# Patient Record
Sex: FEMALE | Race: OTHER | Employment: UNEMPLOYED | URBAN - METROPOLITAN AREA
[De-identification: names, ages, dates, MRNs, and addresses within clinical notes are randomized per-mention and may not be internally consistent; named-entity substitution may affect disease eponyms.]

---

## 2022-07-26 ENCOUNTER — HOSPITAL ENCOUNTER (EMERGENCY)
Age: 22
Discharge: HOME | End: 2022-07-26
Attending: EMERGENCY MEDICINE

## 2022-07-26 VITALS
HEART RATE: 95 BPM | OXYGEN SATURATION: 100 % | RESPIRATION RATE: 18 BRPM | TEMPERATURE: 97.9 F | SYSTOLIC BLOOD PRESSURE: 106 MMHG | DIASTOLIC BLOOD PRESSURE: 68 MMHG | WEIGHT: 129.19 LBS

## 2022-07-26 DIAGNOSIS — N93.8 DUB (DYSFUNCTIONAL UTERINE BLEEDING): Primary | ICD-10-CM

## 2022-07-26 LAB
ALBUMIN SERPL-MCNC: 3.8 G/DL (ref 3.5–5)
ALBUMIN/GLOB SERPL: 1 {RATIO} (ref 1.1–2.2)
ALP SERPL-CCNC: 62 U/L (ref 45–117)
ALT SERPL-CCNC: 24 U/L (ref 12–78)
ANION GAP SERPL CALC-SCNC: 4 MMOL/L (ref 5–15)
APPEARANCE UR: CLEAR
AST SERPL-CCNC: 20 U/L (ref 15–37)
BACTERIA URNS QL MICRO: NEGATIVE /HPF
BASOPHILS # BLD: 0 K/UL (ref 0–0.1)
BASOPHILS NFR BLD: 0 % (ref 0–1)
BILIRUB SERPL-MCNC: 1.2 MG/DL (ref 0.2–1)
BILIRUB UR QL: NEGATIVE
BUN SERPL-MCNC: 10 MG/DL (ref 6–20)
BUN/CREAT SERPL: 15 (ref 12–20)
CALCIUM SERPL-MCNC: 9 MG/DL (ref 8.5–10.1)
CHLORIDE SERPL-SCNC: 106 MMOL/L (ref 97–108)
CO2 SERPL-SCNC: 28 MMOL/L (ref 21–32)
COLOR UR: ABNORMAL
CREAT SERPL-MCNC: 0.67 MG/DL (ref 0.55–1.02)
DIFFERENTIAL METHOD BLD: NORMAL
EOSINOPHIL # BLD: 0.1 K/UL (ref 0–0.4)
EOSINOPHIL NFR BLD: 1 % (ref 0–7)
EPITH CASTS URNS QL MICRO: ABNORMAL /LPF
ERYTHROCYTE [DISTWIDTH] IN BLOOD BY AUTOMATED COUNT: 12.7 % (ref 11.5–14.5)
GLOBULIN SER CALC-MCNC: 3.9 G/DL (ref 2–4)
GLUCOSE SERPL-MCNC: 89 MG/DL (ref 65–100)
GLUCOSE UR STRIP.AUTO-MCNC: NEGATIVE MG/DL
HCG UR QL: NEGATIVE
HCT VFR BLD AUTO: 40 % (ref 35–47)
HGB BLD-MCNC: 12.8 G/DL (ref 11.5–16)
HGB UR QL STRIP: ABNORMAL
HYALINE CASTS URNS QL MICRO: ABNORMAL /LPF (ref 0–2)
IMM GRANULOCYTES # BLD AUTO: 0 K/UL (ref 0–0.04)
IMM GRANULOCYTES NFR BLD AUTO: 0 % (ref 0–0.5)
KETONES UR QL STRIP.AUTO: NEGATIVE MG/DL
LEUKOCYTE ESTERASE UR QL STRIP.AUTO: ABNORMAL
LYMPHOCYTES # BLD: 2.4 K/UL (ref 0.8–3.5)
LYMPHOCYTES NFR BLD: 23 % (ref 12–49)
MCH RBC QN AUTO: 28 PG (ref 26–34)
MCHC RBC AUTO-ENTMCNC: 32 G/DL (ref 30–36.5)
MCV RBC AUTO: 87.5 FL (ref 80–99)
MONOCYTES # BLD: 0.6 K/UL (ref 0–1)
MONOCYTES NFR BLD: 6 % (ref 5–13)
NEUTS SEG # BLD: 7.3 K/UL (ref 1.8–8)
NEUTS SEG NFR BLD: 70 % (ref 32–75)
NITRITE UR QL STRIP.AUTO: NEGATIVE
NRBC # BLD: 0 K/UL (ref 0–0.01)
NRBC BLD-RTO: 0 PER 100 WBC
PH UR STRIP: 7 [PH] (ref 5–8)
PLATELET # BLD AUTO: 395 K/UL (ref 150–400)
PMV BLD AUTO: 9.9 FL (ref 8.9–12.9)
POTASSIUM SERPL-SCNC: 3.8 MMOL/L (ref 3.5–5.1)
PROT SERPL-MCNC: 7.7 G/DL (ref 6.4–8.2)
PROT UR STRIP-MCNC: ABNORMAL MG/DL
RBC # BLD AUTO: 4.57 M/UL (ref 3.8–5.2)
RBC #/AREA URNS HPF: >100 /HPF (ref 0–5)
SODIUM SERPL-SCNC: 138 MMOL/L (ref 136–145)
SP GR UR REFRACTOMETRY: 1.02
UA: UC IF INDICATED,UAUC: ABNORMAL
UROBILINOGEN UR QL STRIP.AUTO: 1 EU/DL (ref 0.2–1)
WBC # BLD AUTO: 10.5 K/UL (ref 3.6–11)
WBC URNS QL MICRO: ABNORMAL /HPF (ref 0–4)

## 2022-07-26 PROCEDURE — 81001 URINALYSIS AUTO W/SCOPE: CPT

## 2022-07-26 PROCEDURE — 87086 URINE CULTURE/COLONY COUNT: CPT

## 2022-07-26 PROCEDURE — 99283 EMERGENCY DEPT VISIT LOW MDM: CPT

## 2022-07-26 PROCEDURE — 80053 COMPREHEN METABOLIC PANEL: CPT

## 2022-07-26 PROCEDURE — 36415 COLL VENOUS BLD VENIPUNCTURE: CPT

## 2022-07-26 PROCEDURE — 85025 COMPLETE CBC W/AUTO DIFF WBC: CPT

## 2022-07-26 PROCEDURE — 81025 URINE PREGNANCY TEST: CPT

## 2022-07-26 PROCEDURE — 74011250637 HC RX REV CODE- 250/637: Performed by: PHYSICIAN ASSISTANT

## 2022-07-26 RX ORDER — IBUPROFEN 400 MG/1
800 TABLET ORAL ONCE
Status: COMPLETED | OUTPATIENT
Start: 2022-07-26 | End: 2022-07-26

## 2022-07-26 RX ADMIN — IBUPROFEN 800 MG: 400 TABLET, FILM COATED ORAL at 17:35

## 2022-07-26 NOTE — ED PROVIDER NOTES
EMERGENCY DEPARTMENT HISTORY AND PHYSICAL EXAM      Date: 7/26/2022  Patient Name: Danielle Campbell    History of Presenting Illness     Chief Complaint   Patient presents with    Abdominal Pain     Pt arrives ambulatory to triage, reports abd pain and vaginal bleeding starting today while she was lifting something heavy. Patient is not sure if she is pregnant. History Provided By: Patient  Due to language barrier, an [employer]  was present during the history-taking and subsequent discussion (and for part of the physical exam) with this patient. HPI: Danielle Campbell, 24 y.o. female without reported PMHx presents BIB self to the ED with cc of sharp lower abdominal pain this afternoon with associated vaginal bleeding. Pain is crampy, nonradiating. Bleeding is described as red and like a menstrual period. She denies excessive bleeding or passing clots. The patient is concerned because she just had a menstrual period 3 weeks ago. One month ago she underwent IUI and has been taking an unknown hormonal oral medication to stimulate ovulation. She is concerned that she may have been pregnant and is now suffering a miscarriage. She has not taken a pregnancy test at home. Denies fever, nausea, vomiting, dysuria, urinary urgency or frequency, change in bowel movements. There are no other complaints, changes, or physical findings at this time. PCP: None    No current facility-administered medications on file prior to encounter. No current outpatient medications on file prior to encounter. Past History     Past Medical History:  No past medical history on file. Past Surgical History:  No past surgical history on file. Family History:  No family history on file. Social History: Allergies:  No Known Allergies      Review of Systems   Review of Systems   Constitutional:  Negative for fever. HENT:  Negative for voice change. Eyes:  Negative for redness.    Respiratory: Negative for shortness of breath. Cardiovascular:  Negative for chest pain. Gastrointestinal:  Positive for abdominal pain. Negative for nausea and vomiting. Genitourinary:  Positive for vaginal bleeding. Negative for difficulty urinating, dysuria, frequency and urgency. Musculoskeletal:  Negative for gait problem. Skin:  Negative for rash. Allergic/Immunologic: Negative for immunocompromised state. Neurological:  Negative for dizziness. Hematological:  Does not bruise/bleed easily. Physical Exam   Physical Exam  Vitals and nursing note reviewed. Constitutional:       General: She is not in acute distress. Appearance: Normal appearance. She is well-developed. She is not toxic-appearing. HENT:      Head: Normocephalic and atraumatic. Nose: Nose normal.      Mouth/Throat:      Mouth: Mucous membranes are moist.   Eyes:      General: Lids are normal.      Extraocular Movements: Extraocular movements intact. Conjunctiva/sclera: Conjunctivae normal.   Cardiovascular:      Rate and Rhythm: Normal rate and regular rhythm. Pulmonary:      Effort: Pulmonary effort is normal.      Breath sounds: Normal breath sounds. Abdominal:      Palpations: Abdomen is soft. Tenderness: There is no abdominal tenderness. There is no right CVA tenderness, left CVA tenderness, guarding or rebound. Musculoskeletal:         General: Normal range of motion. Cervical back: Normal range of motion and neck supple. Skin:     General: Skin is warm and dry. Coloration: Skin is not pale. Neurological:      General: No focal deficit present. Mental Status: She is alert and oriented to person, place, and time. Gait: Gait normal.   Psychiatric:         Mood and Affect: Mood normal.         Behavior: Behavior normal. Behavior is cooperative.        Diagnostic Study Results     Labs -     Recent Results (from the past 12 hour(s))   CBC WITH AUTOMATED DIFF    Collection Time: 07/26/22 4:06 PM   Result Value Ref Range    WBC 10.5 3.6 - 11.0 K/uL    RBC 4.57 3.80 - 5.20 M/uL    HGB 12.8 11.5 - 16.0 g/dL    HCT 40.0 35.0 - 47.0 %    MCV 87.5 80.0 - 99.0 FL    MCH 28.0 26.0 - 34.0 PG    MCHC 32.0 30.0 - 36.5 g/dL    RDW 12.7 11.5 - 14.5 %    PLATELET 604 525 - 413 K/uL    MPV 9.9 8.9 - 12.9 FL    NRBC 0.0 0  WBC    ABSOLUTE NRBC 0.00 0.00 - 0.01 K/uL    NEUTROPHILS 70 32 - 75 %    LYMPHOCYTES 23 12 - 49 %    MONOCYTES 6 5 - 13 %    EOSINOPHILS 1 0 - 7 %    BASOPHILS 0 0 - 1 %    IMMATURE GRANULOCYTES 0 0.0 - 0.5 %    ABS. NEUTROPHILS 7.3 1.8 - 8.0 K/UL    ABS. LYMPHOCYTES 2.4 0.8 - 3.5 K/UL    ABS. MONOCYTES 0.6 0.0 - 1.0 K/UL    ABS. EOSINOPHILS 0.1 0.0 - 0.4 K/UL    ABS. BASOPHILS 0.0 0.0 - 0.1 K/UL    ABS. IMM. GRANS. 0.0 0.00 - 0.04 K/UL    DF AUTOMATED     METABOLIC PANEL, COMPREHENSIVE    Collection Time: 07/26/22  4:06 PM   Result Value Ref Range    Sodium 138 136 - 145 mmol/L    Potassium 3.8 3.5 - 5.1 mmol/L    Chloride 106 97 - 108 mmol/L    CO2 28 21 - 32 mmol/L    Anion gap 4 (L) 5 - 15 mmol/L    Glucose 89 65 - 100 mg/dL    BUN 10 6 - 20 MG/DL    Creatinine 0.67 0.55 - 1.02 MG/DL    BUN/Creatinine ratio 15 12 - 20      GFR est AA >60 >60 ml/min/1.73m2    GFR est non-AA >60 >60 ml/min/1.73m2    Calcium 9.0 8.5 - 10.1 MG/DL    Bilirubin, total 1.2 (H) 0.2 - 1.0 MG/DL    ALT (SGPT) 24 12 - 78 U/L    AST (SGOT) 20 15 - 37 U/L    Alk.  phosphatase 62 45 - 117 U/L    Protein, total 7.7 6.4 - 8.2 g/dL    Albumin 3.8 3.5 - 5.0 g/dL    Globulin 3.9 2.0 - 4.0 g/dL    A-G Ratio 1.0 (L) 1.1 - 2.2     URINALYSIS W/ REFLEX CULTURE    Collection Time: 07/26/22  4:29 PM    Specimen: Urine   Result Value Ref Range    Color YELLOW/STRAW      Appearance CLEAR CLEAR      Specific gravity 1.023      pH (UA) 7.0 5.0 - 8.0      Protein TRACE (A) NEG mg/dL    Glucose Negative NEG mg/dL    Ketone Negative NEG mg/dL    Bilirubin Negative NEG      Blood LARGE (A) NEG      Urobilinogen 1.0 0.2 - 1.0 EU/dL Nitrites Negative NEG      Leukocyte Esterase SMALL (A) NEG      UA:UC IF INDICATED URINE CULTURE ORDERED (A) CNI      WBC 10-20 0 - 4 /hpf    RBC >100 (H) 0 - 5 /hpf    Epithelial cells MODERATE (A) FEW /lpf    Bacteria Negative NEG /hpf    Hyaline cast 0-2 0 - 2 /lpf   HCG URINE, QL. - POC    Collection Time: 07/26/22  4:30 PM   Result Value Ref Range    Pregnancy test,urine (POC) Negative NEG         Radiologic Studies -   No orders to display     CT Results  (Last 48 hours)      None          CXR Results  (Last 48 hours)      None              Medical Decision Making   I am the first provider for this patient. I reviewed the vital signs, available nursing notes, past medical history, past surgical history, family history and social history. Vital Signs-Reviewed the patient's vital signs. Patient Vitals for the past 12 hrs:   Temp Pulse Resp BP SpO2   07/26/22 1537 97.9 °F (36.6 °C) 95 18 106/68 100 %       Records Reviewed: Nursing Notes    Provider Notes (Medical Decision Making):   61-year-old female who underwent IUI 1 month ago and started taking an unknown oral hormonal medication 1 month ago presents with lower abdominal cramping and associated abdominal pain that started acutely today. Patient's LMP was 3 weeks ago. Given this recent IUI, patient was worried she may be having a miscarriage. Here in the ED she is well-appearing and in no acute distress. Vital signs are stable. Abdomen is soft and nontender. Pregnancy test is negative. UA shows hematuria, though I suspect this is contaminant from vaginal bleeding. No overt evidence of infection. Labs are otherwise negative/normal, hemoglobin is stable. Given her benign abdominal exam, I do not feel further work-up with imaging is indicated at this time. I suspect the irregular vaginal bleeding is likely secondary to the recent hormonal medications her OB/GYN started her on.   I recommended that she follow-up with her OB/GYN in 1 to 2 days for further guidance. ED return precautions given. The patient is in agreement with this plan. ED Course:   Initial assessment performed. The patients presenting problems have been discussed, and they are in agreement with the care plan formulated and outlined with them. I have encouraged them to ask questions as they arise throughout their visit. Critical Care Time: None    Disposition:  dc    PLAN:  1. There are no discharge medications for this patient. 2.   Follow-up Information       Follow up With Specialties Details Why Contact Info    [facility] EMERGENCY DEPT Emergency Medicine  As needed, If symptoms worsen [address] [place] [address]  [phone]    Your OBGYN  Call  For follow up           Return to ED if worse     Diagnosis     Clinical Impression:   1. DUB (dysfunctional uterine bleeding)          Please note that this dictation was completed with [employer], the computer voice recognition software. Quite often unanticipated grammatical, syntax, homophones, and other interpretive errors are inadvertently transcribed by the computer software. Please disregards these errors. Please excuse any errors that have escaped final proofreading.

## 2022-07-27 LAB
BACTERIA SPEC CULT: NORMAL
SERVICE CMNT-IMP: NORMAL

## 2022-11-14 LAB
HEP B, EXTERNAL RESULT: NORMAL
RUBELLA TITER, EXTERNAL RESULT: NORMAL

## 2022-11-28 ENCOUNTER — HOSPITAL ENCOUNTER (EMERGENCY)
Age: 22
Discharge: STILL ADMITTED OR ADMINISTRATIVE | End: 2022-11-28

## 2023-01-19 LAB — HIV, EXTERNAL RESULT: NORMAL

## 2023-02-14 LAB
ABO, EXTERNAL RESULT: NORMAL
RPR, EXTERNAL RESULT: NON REACTIVE

## 2023-02-19 ENCOUNTER — APPOINTMENT (OUTPATIENT)
Dept: GENERAL RADIOLOGY | Age: 23
End: 2023-02-19
Attending: EMERGENCY MEDICINE
Payer: MEDICAID

## 2023-02-19 ENCOUNTER — HOSPITAL ENCOUNTER (EMERGENCY)
Age: 23
Discharge: HOME | End: 2023-02-19
Attending: EMERGENCY MEDICINE
Payer: MEDICAID

## 2023-02-19 VITALS
HEIGHT: 62 IN | SYSTOLIC BLOOD PRESSURE: 124 MMHG | BODY MASS INDEX: 27.63 KG/M2 | TEMPERATURE: 98.1 F | HEART RATE: 104 BPM | RESPIRATION RATE: 20 BRPM | WEIGHT: 150.13 LBS | OXYGEN SATURATION: 99 % | DIASTOLIC BLOOD PRESSURE: 79 MMHG

## 2023-02-19 DIAGNOSIS — S00.01XA ABRASION OF SCALP, INITIAL ENCOUNTER: ICD-10-CM

## 2023-02-19 DIAGNOSIS — Z3A.29 29 WEEKS GESTATION OF PREGNANCY: ICD-10-CM

## 2023-02-19 DIAGNOSIS — S50.02XA CONTUSION OF LEFT ELBOW, INITIAL ENCOUNTER: ICD-10-CM
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
## 2023-02-19 PROCEDURE — 74011250636 HC RX REV CODE- 250/636: Performed by: EMERGENCY MEDICINE

## 2023-02-19 PROCEDURE — 96360 HYDRATION IV INFUSION INIT: CPT

## 2023-02-19 PROCEDURE — 73080 X-RAY EXAM OF ELBOW: CPT

## 2023-02-19 PROCEDURE — 74011250637 HC RX REV CODE- 250/637: Performed by: EMERGENCY MEDICINE

## 2023-02-19 PROCEDURE — 96361 HYDRATE IV INFUSION ADD-ON: CPT

## 2023-02-19 PROCEDURE — 99284 EMERGENCY DEPT VISIT MOD MDM: CPT

## 2023-02-19 RX ORDER — LIDOCAINE HYDROCHLORIDE AND EPINEPHRINE 10; 10 MG/ML; UG/ML
1.5 INJECTION, SOLUTION INFILTRATION; PERINEURAL
Status: DISCONTINUED | OUTPATIENT
Start: 2023-02-19 | End: 2023-02-19 | Stop reason: HOSPADM

## 2023-02-19 RX ORDER — ACETAMINOPHEN 500 MG
1000 TABLET ORAL
Status: COMPLETED | OUTPATIENT
Start: 2023-02-19 | End: 2023-02-19

## 2023-02-19 RX ADMIN — ACETAMINOPHEN 1000 MG: 500 TABLET ORAL at 02:55

## 2023-02-19 RX ADMIN — SODIUM CHLORIDE 1000 ML: 9 INJECTION, SOLUTION INTRAVENOUS at 04:36

## 2023-02-19 NOTE — DISCHARGE INSTRUCTIONS
It was a pleasure taking care of you in our Emergency Department today. We know that when you come to [facility], you are entrusting us with your health, comfort, and safety. Our physicians and nurses honor that trust, and truly appreciate the opportunity to care for you and your loved ones. We also value your feedback. If you receive a survey about your Emergency Department experience today, please fill it out. We care about our patients' feedback, and we listen to what you have to say.   Thank you!       --- Dr. Dave Biswas MD

## 2023-02-19 NOTE — ED PROVIDER NOTES
[facility] EMERGENCY DEPT  EMERGENCY DEPARTMENT ENCOUNTER       Pt Name: Emmett George  MRN: [id number]  [place] 2000  Date of evaluation: 2023  Provider: Kelly Pagan MD   PCP: None  Note Started: 4:07 AM 23     CHIEF COMPLAINT       Chief Complaint   Patient presents with    Head Injury     Pt arrives ambulatory to triage for a lac to the left upper part of her scalp and bruises to her left inner arm and elbow. Pt advises she was walking around outside in her dark and she fell but does not remember how she fell. +LOC. -blood thinners. Pt is also 29 weeks pregnant and has been getting prenatal care. Pt denies landing on her abdomen. Denies any home analgesics. Pain 8/10. Fall        HISTORY OF PRESENT ILLNESS: 1 or more elements      History From: Patient using Colombian   HPI Limitations : None     Emmett George is a 25 y.o. female  at 33 weeks, normal pregnancy so far, who presents after GLF at home. She says that she got up from bed, was crossing the room, tripped and fell. Denies LOC (triage note states +LOC, but using  patient denies this absolutely), no confusion or vomiting. No abd pain or injury. Feels fetal movements. No vaginal bleeding. During fall she landed on her left elbow and hit left side of head on the floor. Now reports 8/10 pain in left elbow and very mild headache. No other injuries or complaints. Gets regular prenatal care. Please see more comprehensive history below under MDM  Nursing Notes were all reviewed in real time as they are made available. Any disagreements addressed in the HPI/MDM. REVIEW OF SYSTEMS      Review of Systems   HENT:  Negative for ear pain, facial swelling and nosebleeds. Eyes:  Negative for photophobia, pain and visual disturbance. Respiratory:  Negative for cough and shortness of breath. Cardiovascular:  Negative for chest pain, palpitations and leg swelling.    Gastrointestinal:  Negative for abdominal distention, abdominal pain, nausea and vomiting. Genitourinary:  Negative for dysuria, flank pain, hematuria, pelvic pain, urgency, vaginal bleeding, vaginal discharge and vaginal pain. Musculoskeletal:  Positive for joint swelling. Negative for back pain, neck pain and neck stiffness. Skin:  Positive for wound. Neurological:  Positive for headaches. Negative for dizziness, tremors, seizures, syncope, facial asymmetry, speech difficulty, weakness, light-headedness and numbness. Hematological:  Does not bruise/bleed easily. Psychiatric/Behavioral:  Negative for confusion. Positives and Pertinent negatives as per HPI and MDM. PAST HISTORY     Past Medical History:  No past medical history on file. Past Surgical History:  No past surgical history on file. Family History:  No family history on file. Social History: Allergies:  No Known Allergies    CURRENT MEDICATIONS      Previous Medications    No medications on file         PHYSICAL EXAM      ED Triage Vitals [02/19/23 0101]   ED Encounter Vitals Group      /79      Pulse (Heart Rate) (!) 104      Resp Rate 20      Temp 98.1 °F (36.7 °C)      Temp src       O2 Sat (%) 99 %      Weight 150 lb 2.1 oz      Height 5' 2\"        Physical Exam  Vitals and nursing note reviewed. Constitutional:       General: She is not in acute distress. Appearance: She is not ill-appearing. HENT:      Head: No raccoon eyes, Lee's sign, right periorbital erythema or left periorbital erythema. Jaw: There is normal jaw occlusion. No tenderness. Comments: 5mm abrasion over lateral scalp as depicted. No bleeding     Right Ear: Hearing, tympanic membrane, ear canal and external ear normal. No hemotympanum. Left Ear: Hearing, tympanic membrane, ear canal and external ear normal. No hemotympanum. Eyes:      General: Vision grossly intact. Gaze aligned appropriately.       Extraocular Movements: Extraocular movements intact. Right eye: No nystagmus. Left eye: No nystagmus. Conjunctiva/sclera: Conjunctivae normal.      Pupils: Pupils are equal, round, and reactive to light. Visual Fields: Right eye visual fields normal and left eye visual fields normal.   Cardiovascular:      Rate and Rhythm: Normal rate and regular rhythm. Pulses: Normal pulses. Heart sounds: Normal heart sounds. Pulmonary:      Effort: Pulmonary effort is normal.      Breath sounds: Normal breath sounds. Abdominal:      Palpations: Abdomen is soft. Tenderness: There is no abdominal tenderness. There is no right CVA tenderness or left CVA tenderness. Comments: Appropriately gravid uterus   Musculoskeletal:      Left elbow: Swelling present. No deformity or lacerations. Normal range of motion. Tenderness present. Arms:       Cervical back: Normal range of motion and neck supple. No tenderness. Comments: Swelling and contusion just above left elbow. Skin:     General: Skin is warm and dry. Neurological:      General: No focal deficit present. Mental Status: She is alert and oriented to person, place, and time. Sensory: Sensation is intact. Motor: No seizure activity. Gait: Gait is intact. Psychiatric:         Behavior: Behavior normal.          DIAGNOSTIC RESULTS   LABS:     No results found for this or any previous visit (from the past 24 hour(s)). RADIOLOGY:  Non-plain film images such as CT, Ultrasound and MRI are read by the radiologist. Plain radiographic images are visualized and preliminarily interpreted by the ED Provider with the below findings:     Left elbow xrays reviewed as soon as images were available. No acute process identified. Final radiology read to follow and will be copied below. Interpretation per the Radiologist below, if available at the time of this note:     XR ELBOW LT MIN 3 V   Final Result   No acute abnormality.            PROCEDURES Procedure Note - Sling Placement:  4:54 AM  Performed by: dr. Grisel Escobar  Neurovascularly intact prior to tx. A shoulder sling was placed on pt's left arm. Neurovascularly intact after tx. The procedure took 5 minutes, and pt tolerated well. Procedure Note - Bedside Ultrasound:  4:54 AM  Performed by: dr Rona Nathan of abdomen performed at beside, showing normal fetal movement,   The procedure took 1-15 minutes, and pt tolerated well. SCREENINGS     [facility] and [facility] CT head rules applied. Results both support no indication for further brain imaging. [place] CT Head criteria GCS 13-15  Age equal or greater than 65 years  Vomiting > 2 times  Suspected open or depressed Skull Fracture  Signs suggesting basal skull fracture (Hemotympanum, Racoon eyes, CSF otorrhea or rhinorrhea, Lee's sign)  GCS < 15 at 2 hours post injury  Retrograde Amnesia > 30min  Dangerous mechanism (Pedestrian struck by vehicle, Ejection from motor vehicle, Fall from >3 feet or 5 stairs)     CRITICAL CARE TIME       EMERGENCY DEPARTMENT COURSE and DIFFERENTIAL DIAGNOSIS/MDM     Vitals:    Vitals:    02/19/23 0101   BP: 124/79   Pulse: (!) 104   Resp: 20   Temp: 98.1 °F (36.7 °C)   SpO2: 99%   Weight: 68.1 kg (150 lb 2.1 oz)   Height: 5' 2\" (1.575 m)       Pertinent Chronic Medical Conditions or Prior Surgeries: none    Social Determinants affecting Dx or Tx: None    Records Reviewed: Nursing notes  I reviewed and interpreted the nursing notes and and vital signs from today's visit, as well as the electronic medical record system for any external medical records that were available that may contribute to the patients current condition. Nursing notes will be reviewed and interpreted by me as they become available in realtime while the pt has been in the ED. Independent history : history obtained using language line and [place] .       CC/HPI Summary, DDx, ED Course, and Reassessment:   Patient is an otherwise healthy 18yo  at appx 29 weeks, normal pregnancy so far and gets regular prenatal care, presents after mechanical glf at home as described in hpi. no loss of consciousness or amnestic events. Only complaint currently is right elbow pain. Normal vital signs. GCS 15. Exam notable or superficial abrasion to left side of scalp as depicted above and bruising above left elbow. No scalp hematoma or evidence of skull fracture. No other injuries noted on secondary trauma survey. Abdomen is soft, gravid, not tender. Fetal movements present, no vaginal bleeding. Ddx: scalp abrasion, closed head injury, concussion, scalp contusion. Considered ICH and skull fracture but have low concern based on exam and history. Using clinical gestalt and two separate head trauma criteria as listed under screening exam above, no indication for brain imaging. Scalp abrasion to small for repair. Not bleeding. Patient denies any headache now. Wound cleaned, no further intervention needed. Tdap is UTD. Left elbow: considered sprain, ligament tear, dislocation, fracture. She is neurovascular intact. Xrays obtained showing no acute abnormalities. Provided with ice pack, tylenol and sling. Low concern for abdominal trauma. Obtained bedside US showing fetal mvmts and FHR of 128. She had a bolus of fluids, feels much better on reassessment. Tolerating po. Steady gait, asking to go home. Medical Decision Making  Amount and/or Complexity of Data Reviewed  Radiology: ordered and independent interpretation performed. Decision-making details documented in ED Course. Risk  OTC drugs. Prescription drug management. Disposition Considerations and Planning:  I have discussed with the patient and/or caregiver my initial clinical impression which is based on an evidence-based clinical evaluation of the patient and interpretation of available results.  Involved patient and/or caregiver in management, treatment options and final disposition. Patient and/or caregiver verbalizes understanding and agreement. ED Medications Administered  Medications   lidocaine/EPINEPHrine/tetracaine (LET) topical soln (has no administration in time range)   acetaminophen (TYLENOL) tablet 1,000 mg (1,000 mg Oral Given 2/19/23 0255)       ED Orders Placed:  Orders Placed This Encounter    XR ELBOW LT MIN 3 V    FETAL HEART RATE    DURABLE MEDICAL EQUIPMENT     acetaminophen (TYLENOL) tablet 1,000 mg    lidocaine/EPINEPHrine/tetracaine (LET) topical soln       FINAL IMPRESSION     1. Fall, initial encounter    2. Contusion of left elbow, initial encounter    3. Abrasion of scalp, initial encounter    4. 29 weeks gestation of pregnancy          DISPOSITION/PLAN     Progress note:  Patient has been reassessed and reports feeling considerably better, has normal vital signs and feels comfortable going home. I think this is reasonable as no findings today suggest a life-threatening condition. DISPOSITION: DISCHARGE  The patient's results have been reviewed with patient and available family and/or caregiver. They verbally convey their understanding and agreement of the patient's signs, symptoms, diagnosis, treatment and prognosis and additionally agree to follow up as recommended in the discharge instructions or to return to the Emergency Department should the patient's condition change prior to their follow-up appointment. The patient and available family and/or caregiver verbally agree with the care plan and all of their questions have been answered. The discharge instructions have also been provided to the them with educational information regarding the patient's diagnosis as well a list of reasons why the patient would want to return to the ER prior to their follow-up appointment should any concerns arise, the patient's condition change or symptoms worsen. Joanne Hernández MD, Msc    PLAN:  There are no discharge medications for this patient.     2. Follow-up Information       Follow up With Specialties Details Why Contact Info    your next obgyn appointment  Go to  as scheduled     [facility] EMERGENCY DEPT Emergency Medicine Go to  As needed, If symptoms worsen [address]  [address]  [phone]          3. Return to ED if worse       I am the Primary Clinician of Record. Eugene Dominique MD (electronically signed)    (Please note that parts of this dictation were completed with voice recognition software. Quite often unanticipated grammatical, syntax, homophones, and other interpretive errors are inadvertently transcribed by the computer software. Please disregards these errors.  Please excuse any errors that have escaped final proofreading.)

## 2023-04-20 LAB — GBS, EXTERNAL RESULT: NORMAL

## 2023-05-08 ENCOUNTER — HOSPITAL ENCOUNTER (INPATIENT)
Facility: HOSPITAL | Age: 23
LOS: 2 days | Discharge: HOME | End: 2023-05-10
Attending: OBSTETRICS & GYNECOLOGY | Admitting: OBSTETRICS & GYNECOLOGY
Payer: COMMERCIAL

## 2023-05-08 ENCOUNTER — ANESTHESIA (OUTPATIENT)
Dept: LABOR AND DELIVERY | Facility: HOSPITAL | Age: 23
End: 2023-05-08
Payer: COMMERCIAL

## 2023-05-08 ENCOUNTER — ANESTHESIA EVENT (OUTPATIENT)
Dept: LABOR AND DELIVERY | Facility: HOSPITAL | Age: 23
End: 2023-05-08
Payer: COMMERCIAL

## 2023-05-08 DIAGNOSIS — Z3A.40 40 WEEKS GESTATION OF PREGNANCY: Primary | ICD-10-CM

## 2023-05-08 DIAGNOSIS — Z37.9 NORMAL LABOR: ICD-10-CM

## 2023-05-08 PROBLEM — O47.9 IRREGULAR LABOR: Status: RESOLVED | Noted: 2023-05-08 | Resolved: 2023-05-08

## 2023-05-08 PROBLEM — O47.9 IRREGULAR LABOR: Status: ACTIVE | Noted: 2023-05-08

## 2023-05-08 LAB
AMPHET UR QL SCN: NEGATIVE
BARBITURATES UR QL SCN: NEGATIVE
BENZODIAZ UR QL: NEGATIVE
CANNABINOIDS UR QL SCN: NEGATIVE
COCAINE UR QL SCN: NEGATIVE
ERYTHROCYTE [DISTWIDTH] IN BLOOD BY AUTOMATED COUNT: 14.7 % (ref 11.5–14.5)
HCT VFR BLD AUTO: 31.9 % (ref 35–47)
HGB BLD-MCNC: 9.7 G/DL (ref 11.5–16)
Lab: NORMAL
MCH RBC QN AUTO: 23.8 PG (ref 26–34)
MCHC RBC AUTO-ENTMCNC: 30.4 G/DL (ref 30–36.5)
MCV RBC AUTO: 78.4 FL (ref 80–99)
METHADONE UR QL: NEGATIVE
NRBC # BLD: 0 K/UL (ref 0–0.01)
NRBC BLD-RTO: 0 PER 100 WBC
OPIATES UR QL: NEGATIVE
PCP UR QL: NEGATIVE
PLATELET # BLD AUTO: 354 K/UL (ref 150–400)
PMV BLD AUTO: 11.5 FL (ref 8.9–12.9)
RBC # BLD AUTO: 4.07 M/UL (ref 3.8–5.2)
WBC # BLD AUTO: 13.3 K/UL (ref 3.6–11)

## 2023-05-08 PROCEDURE — 2500000003 HC RX 250 WO HCPCS

## 2023-05-08 PROCEDURE — 99284 EMERGENCY DEPT VISIT MOD MDM: CPT

## 2023-05-08 PROCEDURE — 0KQM0ZZ REPAIR PERINEUM MUSCLE, OPEN APPROACH: ICD-10-PCS | Performed by: OBSTETRICS & GYNECOLOGY

## 2023-05-08 PROCEDURE — 3700000156 HC EPIDURAL ANESTHESIA

## 2023-05-08 PROCEDURE — 1120000000 HC RM PRIVATE OB

## 2023-05-08 PROCEDURE — 85027 COMPLETE CBC AUTOMATED: CPT

## 2023-05-08 PROCEDURE — 86900 BLOOD TYPING SEROLOGIC ABO: CPT

## 2023-05-08 PROCEDURE — 86850 RBC ANTIBODY SCREEN: CPT

## 2023-05-08 PROCEDURE — 7220000101 HC DELIVERY VAGINAL/SINGLE

## 2023-05-08 PROCEDURE — 2500000003 HC RX 250 WO HCPCS: Performed by: STUDENT IN AN ORGANIZED HEALTH CARE EDUCATION/TRAINING PROGRAM

## 2023-05-08 PROCEDURE — 2580000003 HC RX 258: Performed by: OBSTETRICS & GYNECOLOGY

## 2023-05-08 PROCEDURE — 36415 COLL VENOUS BLD VENIPUNCTURE: CPT

## 2023-05-08 PROCEDURE — 6360000002 HC RX W HCPCS: Performed by: OBSTETRICS & GYNECOLOGY

## 2023-05-08 PROCEDURE — 6370000000 HC RX 637 (ALT 250 FOR IP): Performed by: OBSTETRICS & GYNECOLOGY

## 2023-05-08 PROCEDURE — 86901 BLOOD TYPING SEROLOGIC RH(D): CPT

## 2023-05-08 PROCEDURE — 2500000003 HC RX 250 WO HCPCS: Performed by: ANESTHESIOLOGY

## 2023-05-08 PROCEDURE — 0UQMXZZ REPAIR VULVA, EXTERNAL APPROACH: ICD-10-PCS | Performed by: OBSTETRICS & GYNECOLOGY

## 2023-05-08 PROCEDURE — 80307 DRUG TEST PRSMV CHEM ANLYZR: CPT

## 2023-05-08 PROCEDURE — 7210000100 HC LABOR FEE PER 1 HR

## 2023-05-08 RX ORDER — FENTANYL 0.2 MG/100ML-BUPIV 0.125%-NACL 0.9% EPIDURAL INJ 2/0.125 MCG/ML-%
SOLUTION INJECTION
Status: COMPLETED
Start: 2023-05-08 | End: 2023-05-08

## 2023-05-08 RX ORDER — NALOXONE HYDROCHLORIDE 0.4 MG/ML
INJECTION, SOLUTION INTRAMUSCULAR; INTRAVENOUS; SUBCUTANEOUS PRN
Status: DISCONTINUED | OUTPATIENT
Start: 2023-05-08 | End: 2023-05-09

## 2023-05-08 RX ORDER — SODIUM CHLORIDE 9 MG/ML
25 INJECTION, SOLUTION INTRAVENOUS PRN
Status: DISCONTINUED | OUTPATIENT
Start: 2023-05-08 | End: 2023-05-09

## 2023-05-08 RX ORDER — ONDANSETRON 2 MG/ML
4 INJECTION INTRAMUSCULAR; INTRAVENOUS EVERY 6 HOURS PRN
Status: DISCONTINUED | OUTPATIENT
Start: 2023-05-08 | End: 2023-05-09

## 2023-05-08 RX ORDER — FENTANYL 0.2 MG/100ML-BUPIV 0.125%-NACL 0.9% EPIDURAL INJ 2/0.125 MCG/ML-%
1-15 SOLUTION INJECTION CONTINUOUS
Status: DISCONTINUED | OUTPATIENT
Start: 2023-05-08 | End: 2023-05-09

## 2023-05-08 RX ORDER — IBUPROFEN 600 MG/1
600 TABLET ORAL EVERY 8 HOURS PRN
Status: DISCONTINUED | OUTPATIENT
Start: 2023-05-08 | End: 2023-05-10 | Stop reason: HOSPADM

## 2023-05-08 RX ORDER — SODIUM CHLORIDE, SODIUM LACTATE, POTASSIUM CHLORIDE, AND CALCIUM CHLORIDE .6; .31; .03; .02 G/100ML; G/100ML; G/100ML; G/100ML
500 INJECTION, SOLUTION INTRAVENOUS PRN
Status: DISCONTINUED | OUTPATIENT
Start: 2023-05-08 | End: 2023-05-09

## 2023-05-08 RX ORDER — SODIUM CHLORIDE 0.9 % (FLUSH) 0.9 %
5-40 SYRINGE (ML) INJECTION EVERY 12 HOURS SCHEDULED
Status: DISCONTINUED | OUTPATIENT
Start: 2023-05-08 | End: 2023-05-09

## 2023-05-08 RX ORDER — MISOPROSTOL 200 UG/1
800 TABLET ORAL PRN
Status: DISCONTINUED | OUTPATIENT
Start: 2023-05-08 | End: 2023-05-09

## 2023-05-08 RX ORDER — BUPIVACAINE HYDROCHLORIDE AND EPINEPHRINE 5; 5 MG/ML; UG/ML
INJECTION, SOLUTION EPIDURAL; INTRACAUDAL; PERINEURAL
Status: DISPENSED
Start: 2023-05-08 | End: 2023-05-08

## 2023-05-08 RX ORDER — SODIUM CHLORIDE, SODIUM LACTATE, POTASSIUM CHLORIDE, CALCIUM CHLORIDE 600; 310; 30; 20 MG/100ML; MG/100ML; MG/100ML; MG/100ML
INJECTION, SOLUTION INTRAVENOUS CONTINUOUS
Status: DISCONTINUED | OUTPATIENT
Start: 2023-05-08 | End: 2023-05-09

## 2023-05-08 RX ORDER — METHYLERGONOVINE MALEATE 0.2 MG/ML
200 INJECTION INTRAVENOUS PRN
Status: DISCONTINUED | OUTPATIENT
Start: 2023-05-08 | End: 2023-05-09

## 2023-05-08 RX ORDER — MORPHINE SULFATE 2 MG/ML
2 INJECTION, SOLUTION INTRAMUSCULAR; INTRAVENOUS EVERY 4 HOURS PRN
Status: DISCONTINUED | OUTPATIENT
Start: 2023-05-08 | End: 2023-05-09

## 2023-05-08 RX ORDER — BUPIVACAINE HYDROCHLORIDE AND EPINEPHRINE 2.5; 5 MG/ML; UG/ML
INJECTION, SOLUTION EPIDURAL; INFILTRATION; INTRACAUDAL; PERINEURAL PRN
Status: DISCONTINUED | OUTPATIENT
Start: 2023-05-08 | End: 2023-05-08 | Stop reason: SDUPTHER

## 2023-05-08 RX ORDER — SODIUM CHLORIDE 0.9 % (FLUSH) 0.9 %
5-40 SYRINGE (ML) INJECTION PRN
Status: DISCONTINUED | OUTPATIENT
Start: 2023-05-08 | End: 2023-05-09

## 2023-05-08 RX ORDER — MORPHINE SULFATE 2 MG/ML
2 INJECTION, SOLUTION INTRAMUSCULAR; INTRAVENOUS ONCE
Status: DISCONTINUED | OUTPATIENT
Start: 2023-05-08 | End: 2023-05-08

## 2023-05-08 RX ORDER — CARBOPROST TROMETHAMINE 250 UG/ML
250 INJECTION, SOLUTION INTRAMUSCULAR PRN
Status: DISCONTINUED | OUTPATIENT
Start: 2023-05-08 | End: 2023-05-09

## 2023-05-08 RX ORDER — SODIUM CHLORIDE, SODIUM LACTATE, POTASSIUM CHLORIDE, AND CALCIUM CHLORIDE .6; .31; .03; .02 G/100ML; G/100ML; G/100ML; G/100ML
1000 INJECTION, SOLUTION INTRAVENOUS PRN
Status: DISCONTINUED | OUTPATIENT
Start: 2023-05-08 | End: 2023-05-09

## 2023-05-08 RX ADMIN — Medication 166.7 ML: at 20:05

## 2023-05-08 RX ADMIN — IBUPROFEN 600 MG: 600 TABLET, FILM COATED ORAL at 22:45

## 2023-05-08 RX ADMIN — SODIUM CHLORIDE, POTASSIUM CHLORIDE, SODIUM LACTATE AND CALCIUM CHLORIDE 1000 ML: 600; 310; 30; 20 INJECTION, SOLUTION INTRAVENOUS at 06:05

## 2023-05-08 RX ADMIN — Medication 12 ML/HR: at 15:30

## 2023-05-08 RX ADMIN — Medication 87.3 MILLI-UNITS/MIN: at 20:17

## 2023-05-08 RX ADMIN — Medication 12 ML/HR: at 08:45

## 2023-05-08 RX ADMIN — MORPHINE SULFATE 2 MG: 2 INJECTION, SOLUTION INTRAMUSCULAR; INTRAVENOUS at 07:36

## 2023-05-08 RX ADMIN — Medication 166.7 ML: at 20:16

## 2023-05-08 RX ADMIN — SODIUM CHLORIDE, POTASSIUM CHLORIDE, SODIUM LACTATE AND CALCIUM CHLORIDE 125 ML/HR: 600; 310; 30; 20 INJECTION, SOLUTION INTRAVENOUS at 15:33

## 2023-05-08 RX ADMIN — BUPIVACAINE HYDROCHLORIDE AND EPINEPHRINE 5 ML: 2.5; 5 INJECTION, SOLUTION EPIDURAL; INFILTRATION; INTRACAUDAL; PERINEURAL at 17:00

## 2023-05-08 RX ADMIN — Medication 1 MILLI-UNITS/MIN: at 10:13

## 2023-05-08 RX ADMIN — SODIUM CHLORIDE 5 MILLION UNITS: 900 INJECTION INTRAVENOUS at 08:22

## 2023-05-08 RX ADMIN — SODIUM CHLORIDE 2.5 MILLION UNITS: 9 INJECTION, SOLUTION INTRAVENOUS at 11:57

## 2023-05-08 ASSESSMENT — PAIN DESCRIPTION - DESCRIPTORS: DESCRIPTORS: CRAMPING;SORE

## 2023-05-08 ASSESSMENT — PAIN SCALES - GENERAL: PAINLEVEL_OUTOF10: 4

## 2023-05-08 ASSESSMENT — PAIN DESCRIPTION - LOCATION: LOCATION: ABDOMEN;PERINEUM

## 2023-05-08 ASSESSMENT — PAIN - FUNCTIONAL ASSESSMENT: PAIN_FUNCTIONAL_ASSESSMENT: ACTIVITIES ARE NOT PREVENTED

## 2023-05-08 ASSESSMENT — PAIN DESCRIPTION - ORIENTATION: ORIENTATION: LOWER

## 2023-05-08 NOTE — PROGRESS NOTES
Patient reexamined and Cx 3/90/-2. Cat 1, RNST. Will admit. Will draw and hold new Ob labs until able to secure [facility] records. Will treat empirically with PCN for unknown GBS pending records.  Call [facility] for records at 8 AM. Patient desires Epidural.

## 2023-05-08 NOTE — PROGRESS NOTES
sodium chloride 0.9% 100 mL epidural infusion  1-15 mL/hr Epidural Continuous     Facility-Administered Medications Ordered in Other Encounters   Medication Dose Route Frequency    bupivacaine-EPINEPHrine PF (MARCAINE-w/EPINEPHrine) 0.25% -1:[id number] injection   Epidural PRN         No Known Allergies      Vitals:  BP (!) 115/59   Pulse 90   Temp 98.1 °F (36.7 °C) (Oral)   Resp 16   LMP 2022   SpO2 99%   Temp (24hrs), Av.1 °F (36.7 °C), Min:98 °F (36.7 °C), Max:98.1 °F (36.7 °C)        Blood in urinary catheter  Cervix 10   Effacement 100%   Station +1   Cx Position Anterior   Membranes ROM, Clear fluid   FHR Cat CAT 1   Dustin q 3 Minutes   Pitocin yes          Pain relief: Epidural    Assessment: 25 y.o.  at 40w3d  active labor                                                          Plan:  Begin second stage

## 2023-05-08 NOTE — ANESTHESIA PROCEDURE NOTES
CSE Block    Patient location during procedure: OB  Start time: 5/8/2023 8:05 AM  End time: 5/8/2023 8:15 AM  Reason for block: labor epidural  Staffing  Performed: anesthesiologist   Anesthesiologist: Kelvin Orellana MD  CSE  Patient position: sitting  Prep: ChloraPrep  Patient monitoring: frequent blood pressure checks and continuous pulse ox  Approach: midline  Provider prep: mask and sterile gloves  Spinal Needle  Needle type: Sprotte tip   Needle gauge: 25 G  Epidural Needle  Injection technique: GRETA air  Needle type: Tuohy   Needle gauge: 17 G  Needle insertion depth: 5 cm  Location: lumbar (1-5)  Catheter  Catheter at skin depth: 9 cm  Test dose: negative  [id number]  Hemodynamics: stable  Preanesthetic Checklist  Completed: patient identified, IV checked, site marked, risks and benefits discussed, surgical/procedural consents, equipment checked, pre-op evaluation, timeout performed, anesthesia consent given, oxygen available, monitors applied/VS acknowledged, fire risk safety assessment completed and verbalized and blood product R/B/A discussed and consented

## 2023-05-08 NOTE — H&P
Department of Obstetrics and Gynecology  Attending Obstetrics History and Physical        CHIEF COMPLAINT:  contractions    HISTORY OF PRESENT ILLNESS:      The patient is a 25 y.o.  1 parity 0 at 36 w 6 d per patient. Patient denies ROM,vaginal bleeding, or decreased FM. She is unclear as to her frequency of contractions but states they started yesterday and have been getting progressively closer and more painful. She denies any complications of pregnancy and states she had an ultrasound a month ago that was \"normal\". She has been a gyn patient of [facility] in the past but has received her obstetrical care from [facility] this pregnancy. No records are available at this point and the patient does not have knowledge of her GBS status but states she was last seen 2 weeks ago and her Cervical exam was 1 cm dilated. She does not speak [place] but speaks [place] and [place]. This information was obtained via remote . PRENATAL CARE:    Provider:  [facility]      PAST OB HISTORY    OB History    Para Term  AB Living   1             SAB IAB Ectopic Molar Multiple Live Births                    # Outcome Date GA Lbr Og/2nd Weight Sex Delivery Anes PTL Lv   1 Current                  Past Medical History:    Neg  Past Surgical History:    Neg  Social History:    TOBACCO:  Never used tobacco  ETOH:  Never drank alcohol  DRUGS:  Never used recreational drugs  Family History:   Unremarkable  Medications Prior to Admission:  No medications prior to admission. Allergies:  Patient has no known allergies.     REVIEW OF SYSTEMS:      Negative other than specified above    PHYSICAL EXAM:  T 98.1, P 86, /79, R 16  General appearance:  awake, alert, cooperative, no apparent distress, and appears stated age  Neurologic:  Deep Tendon Reflexes:  Right Knee:  2+  Left Knee:  2+  Lungs:  No increased work of breathing, good air exchange, clear to auscultation bilaterally, no crackles or wheezing  Heart:  Normal

## 2023-05-08 NOTE — PROGRESS NOTES
Faye Mack is a 25 y.o.  patient of  obgyn at [facility] who presents to L&D triage with c/o contractions. She reports Positive FM, denies vaginal bleeding and LOF. She also denies Headaches, Scotoma, RUQ pain, and Edema. Urine sample obtained. EFM and toco placed for initial assessment     0215. Attempt to obtain  for [facility]. Unable to obtain  at this time. 3495.  obtained at this time for [facility], notified pt of current hospital provider and plan to come to bedside to assess patient. History and limited prenatal history obtained from patient at this time. Per patient ESCOBAR is 23.     0330. Attempt to obtain  for [facility] at this time. 9240. Dr. Alexei Ibrahim in room, strip reviewed, SVE /. Ultrasound performed to confirm placement of baby- vertex. 7229. New  obtained at this time. Dr. Alexei Ibrahim discussing plan of care with patient, questions concerns addressed at this time. 0540. Dr. Alexei Ibrahim in room, strip reviewed, SVE 3/90/-2, plan to admit patient at this time, discussed plan with patient at this time. 0600. JACQUELINE Freedman RN discussing consents with patient at this time with . 3620. Notified Dr. Alexei Ibrahim pt requesting pain medication, unable to order nubaine at this time, notified Alexei Pizarro, alternative medication ordered. 9500. Bedside shift change report given to JACQUELINE Hernandes RN (oncoming nurse) by JACQUELINE Branch RN (offgoing nurse). Report included the following information Nurse Handoff Report.

## 2023-05-08 NOTE — PROGRESS NOTES
NST:    Baseline: 130    Variability: Moderate    Accelerations: two 15 x 15 accels in a ten minute window    Decelerations: none    Contractions: Q 3-4 minutes    Cat 1 , RNST    This test was done under my supervision and interpreted by me.     Jed Machado M.D.

## 2023-05-08 NOTE — PROGRESS NOTES
LABOR CHECK      Name: Hector Rod MRN: [id number]  SSN: [id number]    YOB: 2000  Age: 25 y.o.   Sex: female      Hector Rod 25 y.o.  at 44w3d  admitted for uterine contractions    Current Facility-Administered Medications   Medication Dose Route Frequency    lactated ringers IV soln infusion   IntraVENous Continuous    lactated ringers bolus  500 mL IntraVENous PRN    Or    lactated ringers bolus  1,000 mL IntraVENous PRN    sodium chloride flush 0.9 % injection 5-40 mL  5-40 mL IntraVENous 2 times per day    sodium chloride flush 0.9 % injection 5-40 mL  5-40 mL IntraVENous PRN    0.9 % sodium chloride infusion  25 mL IntraVENous PRN    ondansetron (ZOFRAN) injection 4 mg  4 mg IntraVENous Q6H PRN    methylergonovine (METHERGINE) injection 200 mcg  200 mcg IntraMUSCular PRN    carboprost (HEMABATE) injection 250 mcg  250 mcg IntraMUSCular PRN    miSOPROStol (CYTOTEC) tablet 800 mcg  800 mcg Rectal PRN    tranexamic acid (CYKLOKAPRON) 1,000 mg in sodium chloride 0.9 % 110 mL IVPB (mini-bag)  1,000 mg IntraVENous Once PRN    benzocaine-menthol (DERMOPLAST) 20-0.5 % spray   Topical PRN    penicillin G potassium 2.5 million units in 0.9% sodium chloride 100 mL IVPB  2.5 Million Units IntraVENous Q4H    morphine (PF) injection 2 mg  2 mg IntraVENous Once    morphine (PF) injection 2 mg  2 mg IntraVENous Q4H PRN    bupivacaine-EPINEPHrine PF (MARCAINE-w/EPINEPHRINE) 0.5% -1:116203 injection        naloxone 0.4 mg in 10 mL sodium chloride syringe   IntraVENous PRN    oxytocin (PITOCIN) 30 units in 500 mL infusion  1-20 suzette-units/min IntraVENous Continuous    naloxone 0.4 mg in 10 mL sodium chloride syringe   IntraVENous PRN    fentaNYL 2 mcg/mL bupivacaine 0.125% in sodium chloride 0.9% 100 mL epidural infusion  1-15 mL/hr Epidural Continuous         No Known Allergies      Vitals:  BP (!) 115/55   Pulse (!) 103   Temp 98.1 °F (36.7 °C)

## 2023-05-08 NOTE — PROGRESS NOTES
used to discuss consents with pt.  disconnected before call was finished. Consents were able to be obtained and pt verbalized understanding.

## 2023-05-08 NOTE — PROGRESS NOTES
sodium chloride 0.9% 100 mL epidural infusion  1-15 mL/hr Epidural Continuous         No Known Allergies      Vitals:  BP (!) 115/59   Pulse 90   Temp 98.1 °F (36.7 °C) (Oral)   Resp 16   LMP 2022   SpO2 99%   Temp (24hrs), Av.1 °F (36.7 °C), Min:98 °F (36.7 °C), Max:98.1 °F (36.7 °C)          Cervix 8   Effacement 90%   Station -1   Cx Position Mid position   Position BERENICE   Membranes ROM, Clear fluid   FHR Cat CAT 1   Sandborn q 3 Minutes   Pitocin 12          Pain relief: Epidural    Assessment: 25 y.o.  at 40w3d  Active phase labor.                                                           Plan: Continue current management plan

## 2023-05-08 NOTE — PROGRESS NOTES
12 - Assumed care of patient. Anesthesia at bedside at 0815. Patient positioned to side of the bed, EFM & TOCO adjusted to accommodate sterile field. Difficulty tracing due to maternal position. Time out completed at 0815. Successful CSE is completed by Dr Katie Medina. Patient is repositioned supine in bed with wedge under left hip. EFM and TOCO replaced and blood pressure frequency increased. Schreiber catheter placed and epidural continuous infusion is started. 0830 - Ephederine given for low bp.       05/08/23 1315   Cervical Exam   Dilation (cm) 4   Effacement [address] [facility] [facility] 6   OB Examiner Dr. Mundo Zhao        05/08/23 1630   Cervical Exam   Dilation (cm) 8   Effacement [address] [facility] ([place] 6   OB Examiner Dr. Mundo Zhao   Membrane/Amniotic Fluid   Membrane Status Artificial   Rupture Date 05/08/23   Rupture Time 1630   Amniotic Fluid Color Clear   Amniotic Fluid Odor None   Amniotic Fluid Amount Moderate     1657 - Dr. Jackson Seek at bedside, rebolusing pts epidural after 3 bolus' given with no relief. 05/08/23 1850   Cervical Exam   Dilation (cm) 10   Dilation Complete Date 05/08/23   Dilation Complete Time [address] [facility] ([place] 4   OB Examiner Dr. Whitney Damian - Bedside shift change report given to JONNATHAN Shaw (oncoming nurse) by JACQUELINE Spears RN (offgoing nurse). Report included the following information Nurse Handoff Report.

## 2023-05-09 PROCEDURE — 6370000000 HC RX 637 (ALT 250 FOR IP): Performed by: OBSTETRICS & GYNECOLOGY

## 2023-05-09 PROCEDURE — 2580000003 HC RX 258: Performed by: OBSTETRICS & GYNECOLOGY

## 2023-05-09 PROCEDURE — 1120000000 HC RM PRIVATE OB

## 2023-05-09 RX ORDER — OXYCODONE HYDROCHLORIDE 5 MG/1
10 TABLET ORAL EVERY 4 HOURS PRN
Status: DISCONTINUED | OUTPATIENT
Start: 2023-05-09 | End: 2023-05-10 | Stop reason: HOSPADM

## 2023-05-09 RX ORDER — OXYCODONE HYDROCHLORIDE 5 MG/1
5 TABLET ORAL EVERY 4 HOURS PRN
Status: DISCONTINUED | OUTPATIENT
Start: 2023-05-09 | End: 2023-05-10 | Stop reason: HOSPADM

## 2023-05-09 RX ORDER — CARBOPROST TROMETHAMINE 250 UG/ML
250 INJECTION, SOLUTION INTRAMUSCULAR PRN
Status: DISCONTINUED | OUTPATIENT
Start: 2023-05-09 | End: 2023-05-10 | Stop reason: HOSPADM

## 2023-05-09 RX ORDER — IBUPROFEN 600 MG/1
600 TABLET ORAL EVERY 8 HOURS SCHEDULED
Status: DISCONTINUED | OUTPATIENT
Start: 2023-05-09 | End: 2023-05-10 | Stop reason: HOSPADM

## 2023-05-09 RX ORDER — MISOPROSTOL 200 UG/1
800 TABLET ORAL PRN
Status: DISCONTINUED | OUTPATIENT
Start: 2023-05-09 | End: 2023-05-10 | Stop reason: HOSPADM

## 2023-05-09 RX ORDER — MISOPROSTOL 200 UG/1
200 TABLET ORAL PRN
Status: DISCONTINUED | OUTPATIENT
Start: 2023-05-09 | End: 2023-05-10 | Stop reason: HOSPADM

## 2023-05-09 RX ORDER — METHYLERGONOVINE MALEATE 0.2 MG/ML
200 INJECTION INTRAVENOUS PRN
Status: DISCONTINUED | OUTPATIENT
Start: 2023-05-09 | End: 2023-05-10 | Stop reason: HOSPADM

## 2023-05-09 RX ORDER — LANOLIN/MINERAL OIL
LOTION (ML) TOPICAL PRN
Status: DISCONTINUED | OUTPATIENT
Start: 2023-05-09 | End: 2023-05-10 | Stop reason: HOSPADM

## 2023-05-09 RX ORDER — SODIUM CHLORIDE 9 MG/ML
INJECTION, SOLUTION INTRAVENOUS PRN
Status: DISCONTINUED | OUTPATIENT
Start: 2023-05-09 | End: 2023-05-10 | Stop reason: HOSPADM

## 2023-05-09 RX ORDER — SODIUM CHLORIDE 0.9 % (FLUSH) 0.9 %
5-40 SYRINGE (ML) INJECTION PRN
Status: DISCONTINUED | OUTPATIENT
Start: 2023-05-09 | End: 2023-05-10 | Stop reason: HOSPADM

## 2023-05-09 RX ORDER — DOCUSATE SODIUM 100 MG/1
100 CAPSULE, LIQUID FILLED ORAL 2 TIMES DAILY
Status: DISCONTINUED | OUTPATIENT
Start: 2023-05-09 | End: 2023-05-10 | Stop reason: HOSPADM

## 2023-05-09 RX ORDER — SODIUM CHLORIDE 0.9 % (FLUSH) 0.9 %
5-40 SYRINGE (ML) INJECTION EVERY 12 HOURS SCHEDULED
Status: DISCONTINUED | OUTPATIENT
Start: 2023-05-09 | End: 2023-05-09

## 2023-05-09 RX ADMIN — IBUPROFEN 600 MG: 600 TABLET, FILM COATED ORAL at 21:48

## 2023-05-09 RX ADMIN — DOCUSATE SODIUM 100 MG: 100 CAPSULE, LIQUID FILLED ORAL at 21:48

## 2023-05-09 RX ADMIN — SODIUM CHLORIDE, PRESERVATIVE FREE 10 ML: 5 INJECTION INTRAVENOUS at 00:08

## 2023-05-09 RX ADMIN — IBUPROFEN 600 MG: 600 TABLET, FILM COATED ORAL at 13:50

## 2023-05-09 RX ADMIN — IBUPROFEN 600 MG: 600 TABLET, FILM COATED ORAL at 06:32

## 2023-05-09 RX ADMIN — DOCUSATE SODIUM 100 MG: 100 CAPSULE, LIQUID FILLED ORAL at 10:07

## 2023-05-09 ASSESSMENT — PAIN DESCRIPTION - LOCATION
LOCATION: PERINEUM
LOCATION: PERINEUM

## 2023-05-09 ASSESSMENT — PAIN DESCRIPTION - DESCRIPTORS
DESCRIPTORS: CRAMPING
DESCRIPTORS: ACHING;CRAMPING

## 2023-05-09 ASSESSMENT — PAIN SCALES - GENERAL
PAINLEVEL_OUTOF10: 6
PAINLEVEL_OUTOF10: 0
PAINLEVEL_OUTOF10: 3

## 2023-05-09 ASSESSMENT — PAIN - FUNCTIONAL ASSESSMENT
PAIN_FUNCTIONAL_ASSESSMENT: ACTIVITIES ARE NOT PREVENTED
PAIN_FUNCTIONAL_ASSESSMENT: ACTIVITIES ARE NOT PREVENTED

## 2023-05-09 ASSESSMENT — PAIN DESCRIPTION - ORIENTATION: ORIENTATION: LOWER

## 2023-05-09 NOTE — PROGRESS NOTES
Post-Partum Day Number 1 Progress Note    Melissaalisa Melchor      # [id number]  Assessment: Doing well, post partum day 1    Plan:  1. Continue routine postpartum and perineal care as well as maternal education. 2. Discontinue IV, shower, ambulate  3. Anticipate discharge home tomorrow. Will f/u PP with [facility]. Information for the patient's :  Altamease Needle [[id number]]   Vaginal, Spontaneous  Patient doing well without significant complaint. Voiding without difficulty, normal lochia. Pain controlled. Tolerating regular diet. Declines circumcision. Vitals:  /61   Pulse 84   Temp 97.9 °F (36.6 °C) (Oral)   Resp 16   LMP 2022   SpO2 98%   Breastfeeding Unknown   Temp (24hrs), Av.5 °F (36.9 °C), Min:97.9 °F (36.6 °C), Max:99.4 °F (37.4 °C)        Exam:   WDWN, Patient without distress. A&O. Heart:  RRR without m/g/r   Lungs:  CTAB without w/c/r                Abdomen soft, fundus firm, nontender                Lower extremities trace bilateral edema    Labs:     Lab Results   Component Value Date/Time    WBC 13.3 2023 06:18 AM    WBC 10.5 2022 04:06 PM    HGB 9.7 2023 06:18 AM    HGB 12.8 2022 04:06 PM    HCT 31.9 2023 06:18 AM    HCT 40.0 2022 04:06 PM     2023 06:18 AM     2022 04:06 PM       No results found for this or any previous visit (from the past 24 hour(s)).

## 2023-05-09 NOTE — ANESTHESIA POSTPROCEDURE EVALUATION
Department of Anesthesiology  Postprocedure Note    Patient: Zaida Cottrell  MRN: [id number]  YOB: 2000  Date of evaluation: 5/9/2023      Procedure Summary     Date: 05/08/23 Room / Location:     Anesthesia Start: 0805 Anesthesia Stop: 2002    Procedure: Labor Analgesia Diagnosis:     Scheduled Providers:  Responsible Provider: Jennifer Pratt MD    Anesthesia Type: Not recorded ASA Status: 2          Anesthesia Type: No value filed.     Alexis Phase I:      Alexis Phase II:        Anesthesia Post Evaluation    Patient location during evaluation: floor  Patient participation: complete - patient participated  Level of consciousness: awake and alert  Pain score: 0  Nausea & Vomiting: no vomiting  Complications: no  Cardiovascular status: blood pressure returned to baseline  Respiratory status: acceptable  Hydration status: euvolemic EMR entered and reviewed for the purpose of chart review in the course of performing educational functions and responsibilities related to performance improvement.

## 2023-05-09 NOTE — ANESTHESIA PRE PROCEDURE
Department of Anesthesiology  Preprocedure Note       Name:  Rashmi Kat   Age:  25 y.o.  :  2000                                          MRN:  [id number]         Date:  2023      Surgeon: * No surgeons listed *    Procedure: * No procedures listed *    Medications prior to admission:   Prior to Admission medications    Not on File       Current medications:    Current Facility-Administered Medications   Medication Dose Route Frequency Provider Last Rate Last Admin    lactated ringers IV soln infusion   IntraVENous Continuous Adrián Johnson MD        lactated ringers bolus  500 mL IntraVENous PRN Adrián Johnson MD        Or   Hernan Perea lactated ringers bolus  1,000 mL IntraVENous PRN Adrián Johnson .9 mL/hr at 23 0605 1,000 mL at 23 0605    sodium chloride flush 0.9 % injection 5-40 mL  5-40 mL IntraVENous 2 times per day Adrián Johnson MD        sodium chloride flush 0.9 % injection 5-40 mL  5-40 mL IntraVENous PRN Adrián Johnson MD        0.9 % sodium chloride infusion  25 mL IntraVENous PRN Adrián Johnson MD        ondansetron [facility]) injection 4 mg  4 mg IntraVENous Q6H PRN Adrián Johnson MD        methylergonovine (METHERGINE) injection 200 mcg  200 mcg IntraMUSCular PRN Adrián Johnson MD        carboprost (HEMABATE) injection 250 mcg  250 mcg IntraMUSCular PRN Adrián Johnson MD        miSOPROStol (CYTOTEC) tablet 800 mcg  800 mcg Rectal PRN Adrián Johnson MD        tranexamic acid (CYKLOKAPRON) 1,000 mg in sodium chloride 0.9 % 110 mL IVPB (mini-bag)  1,000 mg IntraVENous Once PRN Adrián Johnson MD        benzocaine-menthol (DERMOPLAST) 20-0.5 % spray   Topical PRN Adrián Johnson MD        penicillin G potassium 5 Million Units in sodium chloride 0.9 % 100 mL IVPB (mini-bag)  5 Million Units IntraVENous Once Adrián Johnson MD        Followed by   Hernan Perea penicillin G potassium 2.5 million units in 0.9% sodium chloride 100 mL IVPB  2.5 Million Units IntraVENous Q4H
07/26/2022 04:06 PM    BUN 10 07/26/2022 04:06 PM    CREATININE 0.67 07/26/2022 04:06 PM    GFRAA >60 07/26/2022 04:06 PM    AGRATIO 1.0 07/26/2022 04:06 PM    GLUCOSE 89 07/26/2022 04:06 PM    PROT 7.7 07/26/2022 04:06 PM    CALCIUM 9.0 07/26/2022 04:06 PM    BILITOT 1.2 07/26/2022 04:06 PM    ALKPHOS 62 07/26/2022 04:06 PM    AST 20 07/26/2022 04:06 PM    ALT 24 07/26/2022 04:06 PM       POC Tests: No results for input(s): POCGLU, POCNA, POCK, POCCL, POCBUN, POCHEMO, POCHCT in the last 72 hours. Coags: No results found for: PROTIME, INR, APTT    HCG (If Applicable): No results found for: PREGTESTUR, PREGSERUM, HCG, HCGQUANT     ABGs: No results found for: PHART, PO2ART, SSB8XLT, BQX0NFD, BEART, P6YHRNLD     Type & Screen (If Applicable):  No results found for: LABABO, LABRH    Drug/Infectious Status (If Applicable):  No results found for: HIV, HEPCAB    COVID-19 Screening (If Applicable): No results found for: COVID19        Anesthesia Evaluation    Airway: Mallampati: II          Dental:          Pulmonary:                              Cardiovascular:                      Neuro/Psych:               GI/Hepatic/Renal:             Endo/Other:                     Abdominal:             Vascular:           Other Findings:           Anesthesia Plan        Katy Mayer MD   5/9/2023

## 2023-05-09 NOTE — L&D DELIVERY SUMMARY NOTE
Vaginal Delivery Note    25 y.o. Destinee Overcast at 40w3d  with uneventful labor course delivering a term born live male infant over intact perineum. No nuchal cord noted. Placenta delivered intact. Small vaginal wall laceration repaired with 3-0 vicryl. Left labial laceration repaired with 3-0 vicryl.  EBL 200cc    Date of procedure: 5/8/2023      Procedure: Spontaneous vaginal delivery    Obstetrician: Vladimir Moses MD    Anesthesia: Epidural anesthesia    Episiotomy or Incision: none    Indications for instrumental delivery: none    Infant Wt:  @7lb 0.5 oz    Apgars: 8/9   ,      Placenta and Cord:          Mechanism: spontaneous        Description:  complete, 3 vessel cord    Estimated Blood Loss:  200 ml                 Specimens: none    Complications:  none           Condition: stable    Blood Type and Rh:   Lab Results   Component Value Date    ABORH B POSITIVE 05/08/2023       Pt tolerated well    Vladimir Moses MD    5/8/2023  10:27 PM

## 2023-05-09 NOTE — PROGRESS NOTES
-Bedside and Verbal shift change report given to JONNATHAN Mercer (oncoming nurse) by JACQUELINE Dwyer RN (offgoing nurse). Report included the following information Nurse Handoff Report. - Patient begins pushing. RN remains in continuous attendance at the bedside. Assessment & evaluation of fetal heart rate ongoing via continuous EFM. Ana Cristina Aguirre at bedside for assessment. States will return for delivery. MD aware of feal decels with recovery while pushing. patient remains pushing with contractions. RN to call when closer to delivery    - Dr. Juancho Aguirre called for delivery. - RN remained at bedside throughout pushing. EFM continuously assessed.  of live baby boy delivered by Dr. Juancho Aguirre. - recovery started. - recovery completed. - patient reports 4/10 pain and discomfort. 600mg PO Ibuprofen given. Patient now resting quietly in room. - patient was assisted to [facility] by 2 RN. Tolerated ambulating well, voided 100ml clear urine. Epidural catheter removed at this time, catheter tip intact, site left open to air. Patient was then assisted to [facility] room, ambulated in hallway well. Patient report given to Saeid [place] Krystle. Keily Diaz RN for transition of care.

## 2023-05-09 NOTE — LACTATION NOTE
This note was copied from a baby's chart. 23 1200   Visit Information   Lactation Consult Visit Type IP Initial Consult   Visit Length 30 minutes   Reason for Visit Normal  Visit;Education   Breast Feeding History/Assessment   Left Breast Soft  (Medium)   Left Nipple Protrude   Right Nipple Protrude   Right Breast Soft  (Medium)   Breastfeeding History No  Equipment: States has a breast pump; Unsure of the brand   Care Plan/Breast Care   Breast Care Nipple care with colostrum   Lactation Comment Mother states that breastfeeding is going well. Colostrum easily hand expressed. Baby's oral assessment is WDL     Utilized [facility]  #[id number] for this assessment. Reviewed the \"Your Guide to Breastfeeding\" booklet. Discussed the typical feeding characteristics in the 1st and 2nd DOL and signs of adequate intake. Demonstrated the asymmetric latch and observed baby showing good signs of transfer on the breast. Discussed a feeding plan and mother's questions were addressed. Plan:  Offer lots of skin to skin and access to the breast.  Feed baby at early signs of hunger every 2-3 hours. Assure a deep latch, check that baby's lips are turned outward and use breast compression to keep baby actively feeding. Pump/hand express for poor feeds and offer baby EBM. Monitor wet and dirty diapers for signs of adequate intake.

## 2023-05-10 VITALS
HEART RATE: 75 BPM | RESPIRATION RATE: 16 BRPM | SYSTOLIC BLOOD PRESSURE: 121 MMHG | DIASTOLIC BLOOD PRESSURE: 72 MMHG | TEMPERATURE: 97.9 F | OXYGEN SATURATION: 100 %

## 2023-05-10 PROCEDURE — 6370000000 HC RX 637 (ALT 250 FOR IP): Performed by: OBSTETRICS & GYNECOLOGY

## 2023-05-10 RX ORDER — OXYCODONE HYDROCHLORIDE 5 MG/1
5 TABLET ORAL EVERY 4 HOURS PRN
Qty: 10 TABLET | Refills: 0 | Status: SHIPPED | OUTPATIENT
Start: 2023-05-10 | End: 2023-05-13

## 2023-05-10 RX ORDER — IBUPROFEN 600 MG/1
600 TABLET ORAL EVERY 8 HOURS SCHEDULED
Qty: 30 TABLET | Refills: 0 | Status: SHIPPED | OUTPATIENT
Start: 2023-05-10

## 2023-05-10 RX ADMIN — IBUPROFEN 600 MG: 600 TABLET, FILM COATED ORAL at 06:06

## 2023-05-10 ASSESSMENT — PAIN DESCRIPTION - DESCRIPTORS: DESCRIPTORS: CRAMPING

## 2023-05-10 ASSESSMENT — PAIN DESCRIPTION - ORIENTATION: ORIENTATION: LOWER

## 2023-05-10 ASSESSMENT — PAIN SCALES - GENERAL
PAINLEVEL_OUTOF10: 5
PAINLEVEL_OUTOF10: 0

## 2023-05-10 ASSESSMENT — PAIN DESCRIPTION - LOCATION: LOCATION: PERINEUM

## 2023-05-10 ASSESSMENT — PAIN - FUNCTIONAL ASSESSMENT: PAIN_FUNCTIONAL_ASSESSMENT: ACTIVITIES ARE NOT PREVENTED

## 2023-05-10 NOTE — DISCHARGE SUMMARY
Obstetrical Discharge Summary     Name: Sydnee Lopez MRN: [id number]  SSN: [id number]    YOB: 2000  Age: 25 y.o. Sex: female      Admit Date: 5/8/2023    Discharge Date: 5/10/2023     Admitting Physician: Sukhi Lezama MD     Attending Physician:  Sukhi Lezama MD     Admission Diagnoses: Irregular labor [O47.9]    Discharge Diagnoses: 40 week pregnancy, labor, vaginal delivery    Procedure: Vaginal delivery    Exam  BP (!) 103/58   Pulse 75   Temp 97.9 °F (36.6 °C) (Oral)   Resp 16   LMP 07/26/2022   SpO2 100%   Breastfeeding Unknown   Cardiac- regular rate and rhythm  Lungs- clear to auscultation bilaterally  Abdomen- soft, non tender, fundus firm and at umbilicus  Extremities- no mass, minimal edema      Hospital Course: Patient was admitted for labor and had uncomplicated vaginal birth. She was meeting post partum milestones on PPD #2 and discharged with instructions and pain medication as needed. She was breast feeding her infant and did not desire circumcision. Patient Instructions: Counseled about post partum precautions, mood, pre eclampsia, VTE. Please make followup appointment for 4-6 weeks with [facility]  Pelvic rest for 6 weeks      No follow-ups on file.      Signed By:  Lauren Otero MD     May 10, 2023

## 2023-05-10 NOTE — PROGRESS NOTES
MATT [id number] used from 7197 to  [phone]. Discussed plan for discharge, reviewed bleeding, mood, and infection risks. Opportunities for questions given, all concerns answered. Reviewed plan for infant discharge, all questions answered, pt receptive to teaching. Family member will bring car seat to room before discharge.

## 2023-05-10 NOTE — PROGRESS NOTES
Copy of discharge instructions given to patient. Signature page signed and placed in medical records. Verbalized understanding. All questions answered. No distress noted, pt discharged with infant in car seat to schedule follow up in 6 weeks with [facility].

## 2023-05-10 NOTE — PROGRESS NOTES
Attempted to round/discharge Ms. Luis Daniel Chaves, however the [facility]  system was unable to provide an [facility] . Will attempt again later this morning.

## 2023-05-10 NOTE — DISCHARGE INSTRUCTIONS
months if you are breastfeeding. You may bleed more, and longer at first, than you did before you got pregnant. Wait until you are healed (about 4 to 6 weeks) before you have sex. Ask your doctor when it is okay for you to have sex. Try not to travel with your baby for 5 or 6 weeks. If you take a long car trip, make frequent stops to walk around and stretch. Avoid exhaustion  Rest every day. Try to nap when your baby naps. Ask another adult to be with you for a few days after delivery. Plan for  if you have other children. Stay flexible so you can eat at odd hours and sleep when you need to. Both you and your baby are making new schedules. Plan small trips to get out of the house. Change can make you feel less tired. Ask for help with housework, cooking, and shopping. Remind yourself that your job is to care for your baby. Know about help for postpartum depression  \"Baby blues\" are common for the first 1 to 2 weeks after birth. You may cry or feel sad or irritable for no reason. Rest whenever you can. Being tired makes it harder to handle your emotions. Go for walks with your baby. Talk to your partner, friends, and family about your feelings. If your symptoms last for more than a few weeks, or if you feel very depressed, ask your doctor for help. Postpartum depression can be treated. Support groups and counseling can help. Sometimes medicine can also help. Stay healthy  Eat healthy foods so you have more energy. If you breastfeed, avoid drugs. If you quit smoking during pregnancy, try to stay smoke-free. If you choose to have a drink now and then, have only one drink, and limit the number of occasions that you have a drink. Wait to breastfeed at least 2 hours after you have a drink to reduce the amount of alcohol the baby may get in the milk. Start daily exercise after 4 to 6 weeks, but rest when you feel tired. Learn exercises to tone your belly.  Try Kegel exercises to regain strength

## 2023-05-10 NOTE — PROGRESS NOTES
2045 - Used  [id number] in [facility] to discuss Hepatitis B consent and Child ID consent with mother. Mother in agreement for her  to receive both. Consents signed with this RN at the bedside at this time. Also discussed and educated the mother on  the importance of sleep safety, pain control for mother, plan to call for  appointment tomorrow morning, and plan for  to receive a bath and discharge blood work during my shift with her. Mother in agreement with plan and opportunity for questions and answers were given.

## 2023-05-11 LAB
ABO + RH BLD: NORMAL
BLOOD GROUP ANTIBODIES SERPL: NORMAL
SPECIMEN EXP DATE BLD: NORMAL

## 2024-03-16 ENCOUNTER — APPOINTMENT (OUTPATIENT)
Facility: HOSPITAL | Age: 24
End: 2024-03-16
Payer: COMMERCIAL

## 2024-03-16 ENCOUNTER — HOSPITAL ENCOUNTER (OUTPATIENT)
Facility: HOSPITAL | Age: 24
Discharge: LEFT BEFORE DISCHARGE | End: 2024-03-17
Attending: EMERGENCY MEDICINE | Admitting: OBSTETRICS & GYNECOLOGY
Payer: COMMERCIAL

## 2024-03-16 DIAGNOSIS — S80.02XA CONTUSION OF LEFT KNEE, INITIAL ENCOUNTER: ICD-10-CM

## 2024-03-16 DIAGNOSIS — M62.838 CERVICAL PARASPINAL MUSCLE SPASM: ICD-10-CM
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
## 2024-03-16 PROCEDURE — 99285 EMERGENCY DEPT VISIT HI MDM: CPT

## 2024-03-16 PROCEDURE — 73560 X-RAY EXAM OF KNEE 1 OR 2: CPT

## 2024-03-16 ASSESSMENT — LIFESTYLE VARIABLES
HOW MANY STANDARD DRINKS CONTAINING ALCOHOL DO YOU HAVE ON A TYPICAL DAY: PATIENT DOES NOT DRINK
HOW OFTEN DO YOU HAVE A DRINK CONTAINING ALCOHOL: NEVER

## 2024-03-16 ASSESSMENT — PAIN DESCRIPTION - ORIENTATION: ORIENTATION: LOWER

## 2024-03-16 ASSESSMENT — PAIN DESCRIPTION - LOCATION: LOCATION: ABDOMEN

## 2024-03-16 ASSESSMENT — PAIN SCALES - GENERAL: PAINLEVEL_OUTOF10: 7

## 2024-03-17 VITALS
HEART RATE: 100 BPM | HEIGHT: 59 IN | BODY MASS INDEX: 28.22 KG/M2 | RESPIRATION RATE: 18 BRPM | TEMPERATURE: 98.2 F | WEIGHT: 139.99 LBS | SYSTOLIC BLOOD PRESSURE: 95 MMHG | OXYGEN SATURATION: 99 % | DIASTOLIC BLOOD PRESSURE: 51 MMHG

## 2024-03-17 LAB
AMNISURE, POC: NEGATIVE
Lab: NORMAL
NEGATIVE QC PASS/FAIL: NORMAL
POSITIVE QC PASS/FAIL: NORMAL

## 2024-03-17 PROCEDURE — 99214 OFFICE O/P EST MOD 30 MIN: CPT

## 2024-03-17 NOTE — ED NOTES
2021: MD at bedside.     2030: Patient moved to a room with a bed for MD to perform bedside ultrasound.

## 2024-03-17 NOTE — PROGRESS NOTES
2312:  [employer] unable to connect to [employer] .    2342:  [employer] still unable to connect to an [employer] .    2343:  Patient's sister interpreting for patient. Dr. Chicas at bedside for patient exam.    2354:   Consent obtained from patient.  Amnisure collected.  SVE by Dr. Chicas.    0005:  Amnisure negative.     0010:  Patient wishes to leave AMA. Dr. Chicas explained risks of leaving without further medical care. Patient verbalized understanding, still wishes to leave. AMA form signed by patient.     0014:  Patient left unit.
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
## 2024-05-17 ENCOUNTER — HOSPITAL ENCOUNTER (OUTPATIENT)
Facility: HOSPITAL | Age: 24
Discharge: HOME | End: 2024-05-17
Attending: EMERGENCY MEDICINE | Admitting: OBSTETRICS & GYNECOLOGY

## 2024-05-17 VITALS
TEMPERATURE: 98.1 F | WEIGHT: 138.89 LBS | RESPIRATION RATE: 16 BRPM | OXYGEN SATURATION: 97 % | HEART RATE: 88 BPM | SYSTOLIC BLOOD PRESSURE: 114 MMHG | BODY MASS INDEX: 28.05 KG/M2 | DIASTOLIC BLOOD PRESSURE: 61 MMHG

## 2024-05-17 DIAGNOSIS — R10.9 ABDOMINAL CRAMPING: ICD-10-CM

## 2024-05-17 DIAGNOSIS — Z34.93 THIRD TRIMESTER PREGNANCY: Primary | ICD-10-CM

## 2024-05-17 PROCEDURE — 99285 EMERGENCY DEPT VISIT HI MDM: CPT

## 2024-05-17 PROCEDURE — 99202 OFFICE O/P NEW SF 15 MIN: CPT

## 2024-05-18 NOTE — ED TRIAGE NOTES
Patient ambulatory to triage from waiting room with complaint of contractions. Patient is 39 weeks and 5 days gestation, second pregnancy. Patient reports she began experiencing contractions around 2130 and they are 10min apart. Patient denies SRM or bleeding. Patient seen in ED Triage by MD Yohan, L&D called and are aware of patient.

## 2024-05-18 NOTE — PROGRESS NOTES
Angeles Nguyễn is a 23 y.o.  at 39w5d patient of  [facility]  who presents to L&D triage with c/o contractions for 1 hour, with headaches, and dizziness when standing. She reports Positive FM, denies vaginal bleeding and LOF. She also denies blurred vision,edema, and RUQ pain . Urine sample obtained. EFM and toco placed for initial assessment.     2246 Connected with Yoruba .    2324:  at the bedside for SVE. Pt 2-3 cm with occasional contrx.Pt given the option to stay and get a repeat cervical exam, pt declined. DC orders given per       2330: Patient education provided regarding discharge. Opportunity given for patient to ask questions all questions answered appropriately. Patient ambulated off unit.

## 2024-05-18 NOTE — H&P
OB TRIAGE    Name: Angeles Nguyễn MRN: [id number]  SSN: [id number]    YOB: 2000  Age: 23 y.o.  Sex: female      Subjective:     Chief Complaint:  Pregnancy and uterine contractions    History of Present Illness: 23 y.o.  at 39w5d  c/o 2 hours of q 10 min UC. No bleeding, ROM or decrease FM.    OB History          2    Para   1    Term   1            AB        Living   1         SAB        IAB        Ectopic        Molar        Multiple   0    Live Births   1              Past Medical History:   Diagnosis Date    Anemia      No past surgical history on file.  Social History     Occupational History    Not on file   Tobacco Use    Smoking status: Not on file    Smokeless tobacco: Not on file   Substance and Sexual Activity    Alcohol use: Never    Drug use: Never    Sexual activity: Never     No family history on file.    No Known Allergies  Prior to Admission medications    Medication Sig Start Date End Date Taking? Authorizing Provider   ibuprofen (ADVIL;MOTRIN) 600 MG tablet Take 1 tablet by mouth every 8 hours  Patient not taking: Reported on 2024 5/10/23   Whit Chicas MD      Review of Systems   Constitutional:  Negative for chills and fever.   Eyes:  Negative for visual disturbance.   Respiratory:  Negative for shortness of breath.    Cardiovascular:  Negative for chest pain.   Gastrointestinal:  Positive for abdominal pain.   Genitourinary:  Negative for dyspareunia.   Neurological:  Negative for headaches.   Psychiatric/Behavioral: Negative.               Objective:     Vitals:    Vitals:    24 2221 24 2255   BP: 114/76 114/61   Pulse: 92 88   Resp: 18 16   Temp: 97.5 °F (36.4 °C) 98.1 °F (36.7 °C)   TempSrc: Oral Oral   SpO2: 98% 97%   Weight: 63 kg (138 lb 14.2 oz)       Temp (24hrs), Av.8 °F (36.6 °C), Min:97.5 °F (36.4 °C), Max:98.1 °F (36.7 °C)    BP  Min: 114/61  Max: 114/61     Physical Exam:    General: Well-developed, Well-nourished, and No

## 2024-05-18 NOTE — ED PROVIDER NOTES
Brief Medical Screening Examination for OB patient at triage    HPI: 23 year old female 39 weeks pregnant complains of abdominal cramping every 10 minutes.  No vaginal bleeding no loss of fluid, normal fetal movement.    Vitals: No data found.      Physical Exam:  General appearance: No apparent distress.  Stable vitals.  Afebrile.  Skin exam: Warm and dry.  No pallor.  Neurologic: Alert and oriented.  Abdominal exam: Soft, nontender.  Gravid uterus.    Differential diagnosis: Labor, [place] contractions, uterine contractions in pregnancy    This patient with a primary obstetrical chief complaint is stable and medically cleared for direct transfer to the OB Labor & Delivery unit for further monitoring and workup.  Medical screening exam is complete.    MD Yohan Lambert William, MD  05/18/24 1958